# Patient Record
Sex: FEMALE | Race: BLACK OR AFRICAN AMERICAN | NOT HISPANIC OR LATINO | Employment: FULL TIME | URBAN - METROPOLITAN AREA
[De-identification: names, ages, dates, MRNs, and addresses within clinical notes are randomized per-mention and may not be internally consistent; named-entity substitution may affect disease eponyms.]

---

## 2017-12-11 ENCOUNTER — HOSPITAL ENCOUNTER (EMERGENCY)
Facility: OTHER | Age: 22
Discharge: HOME | End: 2017-12-11
Attending: EMERGENCY MEDICINE

## 2017-12-11 VITALS
WEIGHT: 198 LBS | BODY MASS INDEX: 31.08 KG/M2 | HEIGHT: 67 IN | RESPIRATION RATE: 18 BRPM | OXYGEN SATURATION: 100 % | HEART RATE: 80 BPM | DIASTOLIC BLOOD PRESSURE: 90 MMHG | SYSTOLIC BLOOD PRESSURE: 126 MMHG | TEMPERATURE: 98 F

## 2017-12-11 DIAGNOSIS — R55 SYNCOPE, UNSPECIFIED SYNCOPE TYPE: ICD-10-CM

## 2017-12-11 DIAGNOSIS — R56.9 SEIZURE-LIKE ACTIVITY: Primary | ICD-10-CM

## 2017-12-11 LAB
ALBUMIN SERPL BCP-MCNC: 3.2 G/DL
ALP SERPL-CCNC: 32 U/L
ALT SERPL W/O P-5'-P-CCNC: 15 U/L
AMPHET+METHAMPHET UR QL: NEGATIVE
ANION GAP SERPL CALC-SCNC: 5 MMOL/L
AST SERPL-CCNC: 29 U/L
B-HCG UR QL: NEGATIVE
BARBITURATES UR QL SCN>200 NG/ML: NEGATIVE
BASOPHILS # BLD AUTO: 0.01 K/UL
BASOPHILS NFR BLD: 0.2 %
BENZODIAZ UR QL SCN>200 NG/ML: NEGATIVE
BILIRUB SERPL-MCNC: 0.5 MG/DL
BUN SERPL-MCNC: 6 MG/DL
BZE UR QL SCN: NEGATIVE
CALCIUM SERPL-MCNC: 9 MG/DL
CANNABINOIDS UR QL SCN: NORMAL
CHLORIDE SERPL-SCNC: 106 MMOL/L
CO2 SERPL-SCNC: 29 MMOL/L
CREAT SERPL-MCNC: 0.8 MG/DL
CREAT UR-MCNC: 219 MG/DL
CTP QC/QA: YES
DIFFERENTIAL METHOD: ABNORMAL
EOSINOPHIL # BLD AUTO: 0 K/UL
EOSINOPHIL NFR BLD: 0.9 %
ERYTHROCYTE [DISTWIDTH] IN BLOOD BY AUTOMATED COUNT: 14.2 %
EST. GFR  (AFRICAN AMERICAN): >60 ML/MIN/1.73 M^2
EST. GFR  (NON AFRICAN AMERICAN): >60 ML/MIN/1.73 M^2
GLUCOSE SERPL-MCNC: 85 MG/DL
HCT VFR BLD AUTO: 32.8 %
HGB BLD-MCNC: 10.5 G/DL
LYMPHOCYTES # BLD AUTO: 2.2 K/UL
LYMPHOCYTES NFR BLD: 50.1 %
MCH RBC QN AUTO: 29.2 PG
MCHC RBC AUTO-ENTMCNC: 32 G/DL
MCV RBC AUTO: 91 FL
METHADONE UR QL SCN>300 NG/ML: NEGATIVE
MONOCYTES # BLD AUTO: 0.4 K/UL
MONOCYTES NFR BLD: 9.3 %
NEUTROPHILS # BLD AUTO: 1.7 K/UL
NEUTROPHILS NFR BLD: 39.5 %
OPIATES UR QL SCN: NEGATIVE
PCP UR QL SCN>25 NG/ML: NEGATIVE
PLATELET # BLD AUTO: 250 K/UL
PMV BLD AUTO: 9.1 FL
POTASSIUM SERPL-SCNC: 3.8 MMOL/L
PROT SERPL-MCNC: 7.1 G/DL
RBC # BLD AUTO: 3.59 M/UL
SODIUM SERPL-SCNC: 140 MMOL/L
TOXICOLOGY INFORMATION: NORMAL
WBC # BLD AUTO: 4.41 K/UL

## 2017-12-11 PROCEDURE — 80307 DRUG TEST PRSMV CHEM ANLYZR: CPT

## 2017-12-11 PROCEDURE — 85025 COMPLETE CBC W/AUTO DIFF WBC: CPT

## 2017-12-11 PROCEDURE — 99284 EMERGENCY DEPT VISIT MOD MDM: CPT

## 2017-12-11 PROCEDURE — 81025 URINE PREGNANCY TEST: CPT | Performed by: EMERGENCY MEDICINE

## 2017-12-11 PROCEDURE — 80053 COMPREHEN METABOLIC PANEL: CPT

## 2017-12-12 NOTE — ED PROVIDER NOTES
"Encounter Date: 12/11/2017    SCRIBE #1 NOTE: I, Suzanna Fuentes, am scribing for, and in the presence of, Dr. Capps.       History     Chief Complaint   Patient presents with    Seizures     pt with new onset seizure. and fell and hit head today.     Time seen by provider: 6:48 PM    This is a 22 y.o. female, with history of anemia, who presents with concern for two episodes of possible seizure activity which occurred three days ago and yesterday. Onset of first episode of seizure activity occurred while the patient was at work as a  for [employer]. The patient states that she hadn't eaten anything that day and was also wearing many layers of clothing with the inside heating unit on. Patient says that she was informed by coworkers that she had been "blacked out and shaking for about three minutes." She reports feeling fine prior to this episode and attributed it to lack of food and overheating. The patient reports falling head first and currently complains of posterior headache, bilateral leg pain, and left elbow pain. Patient denies fever, chills, nausea, vomiting, abdominal pain, diarrhea, constipation, blurred vision, double vision, numbness, or tingling. She denies trying any medications for the pain.    The second episode of seizure-like activity occurred this morning. Onset occurred while the patient was in the kitchen getting something to drink. She states that "when I came to I was on the floor, and I don't know why." Patient reports feeling weak and light-headed upon waking up early this morning. The patient says that apart from pain she currently feels back to baseline. She admits to use of marijuana and ETOH (wine), but denies use of tobacco or other illicit drugs. Patient denies having a history of previous seizures or family history. The patient has no additional complaints.      The history is provided by the patient.     Review of patient's allergies indicates:  No Known " Allergies  Past Medical History:   Diagnosis Date    Seizures      History reviewed. No pertinent surgical history.  History reviewed. No pertinent family history.  Social History   Substance Use Topics    Smoking status: Current Some Day Smoker    Smokeless tobacco: Not on file    Alcohol use Yes     Review of Systems   Constitutional: Negative for chills and fever.   HENT: Negative for sore throat.    Eyes: Negative for visual disturbance.   Respiratory: Negative for shortness of breath.    Cardiovascular: Negative for chest pain.   Gastrointestinal: Negative for abdominal pain, constipation, diarrhea, nausea and vomiting.   Genitourinary: Negative for dysuria.   Musculoskeletal:        Positive for bilateral leg pain. Positive for left elbow pain.   Skin: Negative for rash.   Neurological: Positive for seizures (possible seizure activity), weakness (resolved), light-headedness (resolved) and headaches. Negative for numbness.   Hematological: Does not bruise/bleed easily.       Physical Exam     Initial Vitals [12/11/17 1800]   BP Pulse Resp Temp SpO2   131/79 100 18 98.4 °F (36.9 °C) 99 %      MAP       96.33         Physical Exam    Constitutional: She appears well-developed and well-nourished. She is not diaphoretic. No distress.   HENT:   Head: Normocephalic and atraumatic.   Mouth/Throat: Oropharynx is clear and moist. No oropharyngeal exudate.   Eyes: EOM are normal. Pupils are equal, round, and reactive to light. Right eye exhibits no discharge. Left eye exhibits no discharge. No scleral icterus.   No pallor or icterus.   Neck: Normal range of motion. Neck supple.   Cardiovascular: Normal rate, regular rhythm, normal heart sounds and intact distal pulses. Exam reveals no gallop and no friction rub.    No murmur heard.  Pulmonary/Chest: Breath sounds normal. No respiratory distress. She has no wheezes. She has no rhonchi. She has no rales.   Abdominal: Soft. Bowel sounds are normal. She exhibits no  distension. There is no tenderness. There is no rebound and no guarding.   Musculoskeletal: Normal range of motion. She exhibits no edema or tenderness.   Neurological: She is alert and oriented to person, place, and time. She has normal strength and normal reflexes. She displays normal reflexes. No cranial nerve deficit or sensory deficit.   Skin: Skin is warm and dry. Capillary refill takes less than 2 seconds. No rash and no abscess noted. No erythema. No pallor.   Psychiatric: She has a normal mood and affect. Her behavior is normal. Judgment and thought content normal.         ED Course   Procedures  Labs Reviewed   CBC W/ AUTO DIFFERENTIAL - Abnormal; Notable for the following:        Result Value    RBC 3.59 (*)     Hemoglobin 10.5 (*)     Hematocrit 32.8 (*)     MPV 9.1 (*)     Gran # 1.7 (*)     Lymph% 50.1 (*)     All other components within normal limits   COMPREHENSIVE METABOLIC PANEL - Abnormal; Notable for the following:     Albumin 3.2 (*)     Alkaline Phosphatase 32 (*)     Anion Gap 5 (*)     All other components within normal limits   DRUG SCREEN PANEL, URINE EMERGENCY   POCT URINE PREGNANCY        Imaging Results          CT Head Without Contrast (Final result)  Result time 12/11/17 19:31:49    Final result by Jorge Burrell MD (12/11/17 19:31:49)                 Impression:       No acute intracranial abnormalities identified.      Electronically signed by: JORGE BURRELL MD  Date:     12/11/17  Time:    19:31              Narrative:    Exam: CT of the head without contrast -- 22-year-old female status post head injury.    Comparison: None.    Technique: 5 mm noncontrast axial images through the brain were obtained.    Findings: The brain is normally formed and exhibits normal density throughout with no indication of acute/recent major vascular distribution cerebral infarction, intraparenchymal hemorrhage, or intra-axial space occupying lesion. The ventricular system is normal in size and  configuration with no evidence of hydrocephalus. No effacement of the skull-base cisterns. No abnormal extra-axial fluid collections or blood products. The paranasal sinuses and mastoid air cells are unremarkable. The calvarium shows no significant abnormality.                                 Medical Decision Making:   Independently Interpreted Test(s):   I have ordered and independently interpreted X-rays - see prior notes.  Clinical Tests:   Lab Tests: Ordered and Reviewed  Radiological Study: Ordered and Reviewed            Scribe Attestation:   Scribe #1: I performed the above scribed service and the documentation accurately describes the services I performed. I attest to the accuracy of the note.    Attending Attestation:           Physician Attestation for Scribe:  Physician Attestation Statement for Scribe #1: I, Dr. Capps, reviewed documentation, as scribed by Suzanna Fuentes in my presence, and it is both accurate and complete.                 ED Course      Healthy female, presents due to 2 episodes of syncope over the past few days the first will was while she was at work.  She reports she was dressed in many layers due to the cold outside, was inside and feeling very hot/flushed he apparently passed out.  Bystanders reported that she seizure-like activity.  She does not remember the event.  She woke up this morning feeling some general malaise, fatigue and reports she was in her kitchen when she passed out again.  She felt achy all over after this and although it was unwitnessed, she was concerned that this may have been another episode of seizure-like activity.  No history of seizure as a child no other medical problems occasional marijuana and alcohol use though neither recently.  No other medications.  No focal deficits on exam afebrile and well-appearing due to the concern for new onset seizure did perform workup to include head CT laboratory studies which were unremarkable advised the patient that at  this time is unclear if this is syncope with the clonic activity or true seizure activity cautioned against driving ladders swimming etc. we'll provide a work note for the next 2 days and have encouraged patient to follow-up with neurology preferably, or primary care.  Discussed the pros/cons of initiating medication especially when diagnosis is unclear patient is comfortable observing for further episodes returning if they occur and following up for neurology's evaluation    Clinical Impression:     1. Seizure-like activity    2. Syncope, unspecified syncope type                               Jarett Capps II, MD  12/11/17 [id number]

## 2017-12-12 NOTE — ED NOTES
Rec'd report from MAGDA Tellez RN. Pt is in semi-fowlers position in bed w/ complaint of weakness at this time. Pt denies pain/discomfort. Pt is A & O x 3, denies SOB, respiratory distress and respirations are even and unlabored. Pt is able to maintain own airway. Skin is warm and dry w/ pink mucosa/dry and pink. VS. Bed is locked and in the low position w/ the side rails up and locked for safety. Pt advised of need for hourly rounding. Call bell @ BS.  @ BS. Will continue to monitor closely.

## 2017-12-12 NOTE — ED NOTES
Pt reports first seizure last week that was witnessed at work, refused transport to ED at that time, unwitnessed syncopal episode today, c/o neck and L arm pain post fall        LOC: The patient is awake, alert and aware of environment with an appropriate affect, the patient is oriented x 3 and speaking appropriately.  APPEARANCE: Patient resting comfortably and in no acute distress, patient is clean and well groomed, patient's clothing is properly fastened.  SKIN: The skin is warm and dry, patient has normal skin turgor and moist mucus membranes, skin intact, no breakdown or brusing noted.  MUSKULOSKELETAL: Patient moving all extremities well, no obvious swelling or deformities noted.  RESPIRATORY: Airway is open and patent, respirations are spontaneous, patient has a normal effort and rate. Breath sounds are clear and equal bilaterally.  CARDIAC: Normal heart sounds. No peripheral edema.  ABDOMEN: Soft and non tender to palpation, no distention noted. Bowel sounds present.